# Patient Record
Sex: MALE | Race: WHITE | HISPANIC OR LATINO | Employment: FULL TIME | URBAN - METROPOLITAN AREA
[De-identification: names, ages, dates, MRNs, and addresses within clinical notes are randomized per-mention and may not be internally consistent; named-entity substitution may affect disease eponyms.]

---

## 2020-05-15 ENCOUNTER — NON-PROVIDER VISIT (OUTPATIENT)
Dept: URGENT CARE | Facility: CLINIC | Age: 30
End: 2020-05-15

## 2020-05-15 DIAGNOSIS — Z02.1 PRE-EMPLOYMENT DRUG SCREENING: ICD-10-CM

## 2020-05-15 DIAGNOSIS — Z02.89 ENCOUNTER FOR OCCUPATIONAL HEALTH EXAMINATION: ICD-10-CM

## 2020-05-15 LAB
AMP AMPHETAMINE: NORMAL
COC COCAINE: NORMAL
INT CON NEG: NEGATIVE
INT CON POS: POSITIVE
MET METHAMPHETAMINES: NORMAL
OPI OPIATES: NORMAL
PCP PHENCYCLIDINE: NORMAL
POC DRUG COMMENT 753798-OCCUPATIONAL HEALTH: NORMAL
THC: NORMAL

## 2020-05-15 PROCEDURE — 80305 DRUG TEST PRSMV DIR OPT OBS: CPT | Performed by: PREVENTIVE MEDICINE

## 2020-06-16 ENCOUNTER — OCCUPATIONAL MEDICINE (OUTPATIENT)
Dept: URGENT CARE | Facility: PHYSICIAN GROUP | Age: 30
End: 2020-06-16
Payer: COMMERCIAL

## 2020-06-16 ENCOUNTER — HOSPITAL ENCOUNTER (OUTPATIENT)
Dept: RADIOLOGY | Facility: MEDICAL CENTER | Age: 30
End: 2020-06-16
Attending: NURSE PRACTITIONER
Payer: COMMERCIAL

## 2020-06-16 VITALS
DIASTOLIC BLOOD PRESSURE: 104 MMHG | HEIGHT: 68 IN | HEART RATE: 90 BPM | OXYGEN SATURATION: 98 % | TEMPERATURE: 97.7 F | RESPIRATION RATE: 16 BRPM | WEIGHT: 315 LBS | BODY MASS INDEX: 47.74 KG/M2 | SYSTOLIC BLOOD PRESSURE: 160 MMHG

## 2020-06-16 DIAGNOSIS — S96.911A STRAIN OF RIGHT FOOT, INITIAL ENCOUNTER: ICD-10-CM

## 2020-06-16 DIAGNOSIS — M79.671 ACUTE PAIN OF RIGHT FOOT: ICD-10-CM

## 2020-06-16 PROBLEM — N52.9 IMPOTENCE OF ORGANIC ORIGIN: Status: ACTIVE | Noted: 2020-06-16

## 2020-06-16 PROBLEM — I10 HYPERTENSION: Status: ACTIVE | Noted: 2020-06-16

## 2020-06-16 PROBLEM — E66.01 MORBID OBESITY DUE TO EXCESS CALORIES (HCC): Status: ACTIVE | Noted: 2017-08-09

## 2020-06-16 PROBLEM — B35.4 TINEA CORPORIS: Status: ACTIVE | Noted: 2020-06-16

## 2020-06-16 PROCEDURE — 73630 X-RAY EXAM OF FOOT: CPT | Mod: RT

## 2020-06-16 PROCEDURE — 99203 OFFICE O/P NEW LOW 30 MIN: CPT | Performed by: NURSE PRACTITIONER

## 2020-06-16 NOTE — LETTER
[facility]  [address] [place] [address]  Phone:  [phone] - Fax:  [phone]   [facility] Progress Report and Disability Certification  Date of Service: 6/16/2020   No Show:  No  Date / Time of Next Visit: 6/23/2020   Claim Information   Patient Name: Axel Tidwell Jr.  Claim Number:     Employer: [employer]  Date of Injury: 5/24/2020     Insurer / TPA: [employer]  ID / SSN:     Occupation:   Diagnosis: The encounter diagnosis was Strain of right foot, initial encounter.    Medical Information   Related to Industrial Injury? Yes    Subjective Complaints:  DOI 5/24/20: Patient works at  at [employer]. 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 On DOI he placed his right foot down onto the ground after it came to a stop, felt a twinge to lateral aspect of his right foot. Since then, he has slowly had worsening pain and swelling to the area. Pain is exacerbated with ambulation and pressure. Pain is worse at end of the day and when awakening. He typically wears tennis shoes while at work. He has been soaking his foot twice per day and using a topical cream for pain relief. He has also used ibuprofen. He does not have any other jobs or contributing factors.    Objective Findings: A/Ox4. NAD.  Right foot: Normal in appearance, there is some tenderness to mid fourth and fifth metatarsal.  Foot has full range of motion, neurovascular is intact, skin is intact, cap refill is brisk.  There is no bony tenderness to ankle or toes.  Gait is steady.   Pre-Existing Condition(s): Denies    Assessment:   Initial Visit    Status: Additional Care Required  Permanent Disability:No    Plan: Medication  Comments:OTC Tylenol, supportive footwear, RICE, work restriction, return in 1 week for reevaluation.    Diagnostics: X-ray  Comments:negative       Comments:       Disability Information   Status: Released to Restricted Duty    From:  6/16/2020  Through: 6/23/2020 Restrictions are: Temporary   Physical Restrictions   Sitting:    Standing:  < or = to 6 hrs/day Stooping:    Bending:      Squatting:    Walking:  < or = to 6 hrs/day Climbing:    Pushing:      Pulling:    Other:    Reaching Above Shoulder (L):   Reaching Above Shoulder (R):       Reaching Below Shoulder (L):    Reaching Below Shoulder (R):      Not to exceed Weight Limits   Carrying(hrs):   Weight Limit(lb): < or = to 25 pounds Lifting(hrs):   Weight  Limit(lb): < or = to 25 pounds   Comments:      Repetitive Actions   Hands: i.e. Fine Manipulations from Grasping:     Feet: i.e. Operating Foot Controls:     Driving / Operate Machinery:     Physician Name: SAMANTHA Armenta Physician Signature: TERESA Garber e-Signature: Dr. Dwight Larry, Medical Director   Clinic Name / Location: [address] [address] Clinic Phone Number: Dept: [phone]   Appointment Time: 6:00 Pm Visit Start Time: 6:18 PM   Check-In Time:  6:09 Pm Visit Discharge Time: 7:03 Pm    Original-Treating Physician or Chiropractor    Page 2-Insurer/TPA    Page 3-Employer    Page 4-Employee

## 2020-06-16 NOTE — LETTER
"EMPLOYEE’S CLAIM FOR COMPENSATION/ REPORT OF INITIAL TREATMENT  FORM C-4    EMPLOYEE’S CLAIM - PROVIDE ALL INFORMATION REQUESTED   First Name  Axel Last Name  Yaw Birthdate                    1990                Sex  male Claim Number   Home Address  [address]  Age  30 y.o. Height  1.715 m (5' 7.5\") Weight  (!) 142.9 kg (315 lb) [facility]     [facility] Zip  [address] Telephone  [phone] (home)    Mailing Address  [address]  [facility] [place] [facility] Zip  [address] Primary Language Spoken  English    Insurer  [employer] Third Party   [employer]   Employee's Occupation (Job Title) When Injury or Occupational Disease Occurred      Employer's Name  [employer]  Telephone  [phone]    Employer Address  [address]  [facility]  [address]   Date of Injury  5/24/2020               Hour of Injury  2:15 AM Date Employer Notified  6/16/2020 Last Day of Work after Injury or Occupational Disease  6/16/2020 Supervisor to Whom Injury Reported  Lele   Address or Location of Accident (if applicable)  [[address] [place]]   What were you doing at the time of accident? (if applicable)  Using a morext scooter    How did this injury or occupational disease occur? (Be specific an answer in detail. Use additional sheet if necessary)  Long period of standing. towards the end f da felt sharp pain when stepping off   If you believe that you have an occupational disease, when did you first have knowledge of the disability and it relationship to your employment?  na Witnesses to the Accident  na      Nature of Injury or Occupational Disease  Workers' Compensation  Part(s) of Body Injured or Affected  Foot (L), Foot (R), N/A    I certify that the above is true and correct to the best of my knowledge and that I have provided " this information in order to obtain the benefits of [place]  I hereby authorize any physician, chiropractor, surgeon, practitioner, or other person, any hospital, including [facility] or [facility] hospital, any medical service organization, any insurance company, or other institution or organization to release to each other, any medical or other information, including benefits paid or payable, pertinent to this injury or disease, except information relative to diagnosis, treatment and/or counseling for AIDS, psychological conditions, alcohol or controlled substances, for which I must give specific authorization.  A Photostat of this authorization shall be as valid as the original.     Date   Place   Employee’s Signature   THIS REPORT MUST BE COMPLETED AND MAILED WITHIN 3 WORKING DAYS OF TREATMENT   Place  [facility]  Name of Facility  [facility]   Date  6/16/2020 Diagnosis  (S96.911A) Strain of right foot, initial encounter Is there evidence the injured employee was under the influence of alcohol and/or another controlled substance at the time of accident?   Hour  6:18 PM Description of Injury or Disease  The encounter diagnosis was Strain of right foot, initial encounter. No   Treatment  OTC Tylenol, supportive footwear, RICE, work restriction, return in 1 week for reevaluation  Have you advised the patient to remain off work five days or more? No   X-Ray Findings  Negative   If Yes   From Date  To Date      From information given by the employee, together with medical evidence, can you directly connect this injury or occupational disease as job incurred?  Yes If No Full Duty No Modified Duty  Yes   Is additional medical care by a physician indicated?  Yes If Modified Duty, Specify any Limitations / Restrictions  Per D-39   Do you know of any previous injury or disease contributing to  "this condition or occupational disease?                            No   Date  6/16/2020 Print Doctor’s Name SAMANTHA Armenta I certify the employer’s copy of  this form was mailed on:   Address  [address] Insurer’s Use Only     [facility] Zip  [address]    Provider’s Tax ID Number  [id number] Telephone  Dept: [phone]        myriam-TERESA Monge   e-Signature: Dr. Dwight Larry,   Medical Director Degree  MD        ORIGINAL-TREATING PHYSICIAN OR CHIROPRACTOR    PAGE 2-INSURER/TPA    PAGE 3-EMPLOYER    PAGE 4-EMPLOYEE             Form C-4 (rev.10/07)              BRIEF DESCRIPTION OF RIGHTS AND BENEFITS  (Pursuant to [place]RS 616C.050)    Notice of Injury or Occupational Disease (Incident Report Form C-1): If an injury or occupational disease (OD) arises out of and in the course of employment, you must provide written notice to your employer as soon as practicable, but no later than 7 days after the accident or OD. Your employer shall maintain a sufficient supply of the required forms.    Claim for Compensation (Form C-4): If medical treatment is sought, the form C-4 is available at the place of initial treatment. A completed \"Claim for Compensation\" (Form C-4) must be filed within 90 days after an accident or OD. The treating physician or chiropractor must, within 3 working days after treatment, complete and mail to the employer, the employer's insurer and third-party , the Claim for Compensation.    Medical Treatment: If you require medical treatment for your on-the-job injury or OD, you may be required to select a physician or chiropractor from a list provided by your workers’ compensation insurer, if it has contracted with an Organization for Managed Care (MCO) or Preferred Provider Organization (PPO) or providers of health care. If your employer has not entered into a contract with an MCO or PPO, you may select a physician or chiropractor from the Panel of Physicians " and Chiropractors. Any medical costs related to your industrial injury or OD will be paid by your insurer.    Temporary Total Disability (TTD): If your doctor has certified that you are unable to work for a period of at least 5 consecutive days, or 5 cumulative days in a 20-day period, or places restrictions on you that your employer does not accommodate, you may be entitled to TTD compensation.    Temporary Partial Disability (TPD): If the wage you receive upon reemployment is less than the compensation for TTD to which you are entitled, the insurer may be required to pay you TPD compensation to make up the difference. TPD can only be paid for a maximum of 24 months.    Permanent Partial Disability (PPD): When your medical condition is stable and there is an indication of a PPD as a result of your injury or OD, within 30 days, your insurer must arrange for an evaluation by a rating physician or chiropractor to determine the degree of your PPD. The amount of your PPD award depends on the date of injury, the results of the PPD evaluation and your age and wage.    Permanent Total Disability (PTD): If you are medically certified by a treating physician or chiropractor as permanently and totally disabled and have been granted a PTD status by your insurer, you are entitled to receive monthly benefits not to exceed 66 2/3% of your average monthly wage. The amount of your PTD payments is subject to reduction if you previously received a PPD award.    Vocational Rehabilitation Services: You may be eligible for vocational rehabilitation services if you are unable to return to the job due to a permanent physical impairment or permanent restrictions as a result of your injury or occupational disease.    Transportation and Per Abhinav Reimbursement: You may be eligible for travel expenses and per abhinav associated with medical treatment.    Reopening: You may be able to reopen your claim if your condition worsens after claim  closure.    Appeal Process: If you disagree with a written determination issued by the insurer or the insurer does not respond to your request, you may appeal to the Department of Administration, , by following the instructions contained in your determination letter. You must appeal the determination within 70 days from the date of the determination letter at [address], [address], [place], [place] [address], or [address], [address], [place], [place] [address]. If you disagree with the  decision, you may appeal to the Department of Administration, . You must file your appeal within 30 days from the date of the  decision letter at [address], [address], [place], [place] [address], or [address], [facility] [address], [place], [place] [address]. If you disagree with a decision of an , you may file a petition for judicial review with the District Court. You must do so within 30 days of the Appeal Officer’s decision. You may be represented by an  at your own expense or you may contact the [facility] for possible representation.    [place]  for Injured Workers (NAIW): If you disagree with a  decision, you may request that NAIW represent you without charge at an  Hearing. For information regarding denial of benefits, you may contact the [facility] at: [address], [address], [place] [address], [phone], or [address], [address], [place] [address], [phone]    To File a Complaint with the Division: If you wish to file a complaint with the  of the Division of Industrial Relations (DIR),  please contact the Workers’ Compensation Section, [address], [facility] [address], [place], [place] [address], telephone [phone], or [address], [place], [place] [address], telephone [phone].    For assistance with Workers’  Compensation Issues: You may contact the Office of the Governor Consumer Health Assistance, [address], [place], [place] [address], Toll Free 1-435.135.2626, Web site: http://govcha.[facility].nv., E-mail [email or web address].                   __________________________________________________________________                                                     _________        Employee Name / Signature                                                                                                                                              Date                                                                                                                                                                                                     D-2 (rev. 06/18)

## 2020-06-17 NOTE — PROGRESS NOTES
Chief Complaint   Patient presents with   • Work-Related Injury     NEW WC/Feet pain       HISTORY OF PRESENT ILLNESS: Patient is a 30 y.o. male who presents to urgent care today with a work comp complaint of right foot pain. DOI 5/24/20: Patient works at  at [employer]. 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 On DOI he placed his right foot down onto the ground after it came to a stop, felt a twinge to lateral aspect of his right foot. Since then, he has slowly had worsening pain and swelling to the area. Pain is exacerbated with ambulation and pressure. Pain is worse at end of the day and when awakening. He typically wears tennis shoes while at work. He has been soaking his foot twice per day and using a topical cream for pain relief. He has also used ibuprofen. He does not have any other jobs or contributing factors.  As a side note, the patient admits to history of hypertension, he is prescribed medication for such but did not take today.      PMH: No pertinent past medical history to this problem  MEDS: Medications were reviewed in Epic  ALLERGIES: Allergies were reviewed in Epic  FH: No pertinent family history to this problem      ROS:  Review of Systems   Constitutional: Negative for fever, chills, weight loss, malaise, and fatigue.   HENT: Negative for ear pain, nosebleeds, congestion, sore throat and neck pain.    Eyes: Negative for vision changes.   Neuro: Negative for headache, sensory changes, weakness, seizure, LOC.   Cardiovascular: Negative for chest pain, palpitations, orthopnea and leg swelling.   Respiratory: Negative for cough, sputum production, shortness of breath and wheezing.   Gastrointestinal: Negative for abdominal pain, nausea, vomiting or diarrhea.   Genitourinary: Negative for dysuria, urgency and frequency.  Musculoskeletal: Negative for falls, neck pain, back pain, joint pain, myalgias.   Skin:  "Negative for rash, diaphoresis.     Exam:  /104   Pulse 90   Temp 36.5 °C (97.7 °F) (Temporal)   Resp 16   Ht 1.715 m (5' 7.5\")   Wt (!) 142.9 kg (315 lb)   SpO2 98%   General: well-nourished, well-developed male in NAD  Head: normocephalic, atraumatic  Eyes: PERRLA, no conjunctival injection, acuity grossly intact, lids normal.  Ears: normal shape and symmetry, no tenderness, no discharge. External canals are without any significant edema or erythema. Tympanic membranes are without any inflammation, no effusion. Gross auditory acuity is intact.  Nose: symmetrical without tenderness, no discharge.  Mouth/Throat: reasonable hygiene, no erythema, exudates or tonsillar enlargement.  Neck: no masses, range of motion within normal limits, no tracheal deviation. No obvious thyroid enlargement.   Lymph: no cervical adenopathy. No supraclavicular adenopathy.   Neuro: alert and oriented. Cranial nerves 1-12 grossly intact. No sensory deficit.   Cardiovascular: regular rate and rhythm. No edema.  Pulmonary: no distress. Chest is symmetrical with respiration, no wheezes, crackles, or rhonchi.   Musculoskeletal: no clubbing, appropriate muscle tone, gait is stable.Right foot: Normal in appearance, there is some tenderness to mid fourth and fifth metatarsal.  Foot has full range of motion, neurovascular is intact, skin is intact, cap refill is brisk.  There is no bony tenderness to ankle or toes.  Gait is steady.   Skin: warm, dry, intact, no clubbing, no cyanosis, no rashes.   Psych: appropriate mood, affect, judgement.       DX right foot radiology reading \"No acute osseous abnormality.\"      Assessment/Plan:  1. Strain of right foot, initial encounter  DX-FOOT-COMPLETE 3+ RIGHT       X-ray negative, suspect strain.  The patient reports improvement with supportive footwear but removes once home from work, therefore I have instructed the patient to wear supportive foot wear with any ambulation.  OTC Tylenol, RICE, " work restriction, return in 1 week for reevaluation.  Supportive care, differential diagnoses, and indications for immediate follow-up discussed with patient.   Pathogenesis of diagnosis discussed including typical length and natural progression.   Instructed to return to clinic or nearest emergency department sooner for any change in condition, further concerns, or worsening of symptoms.  Patient states understanding of the plan of care and discharge instructions.  The patient's blood pressure is found to be elevated today, I have instructed patient to take his blood pressure medication as directed, he is in agreement.        Please note that this dictation was created using voice recognition software. I have made every reasonable attempt to correct obvious errors, but I expect that there are errors of grammar and possibly content that I did not discover before finalizing the note.      OLGA Armenta.

## 2020-06-23 ENCOUNTER — OCCUPATIONAL MEDICINE (OUTPATIENT)
Dept: URGENT CARE | Facility: PHYSICIAN GROUP | Age: 30
End: 2020-06-23
Payer: COMMERCIAL

## 2020-06-23 VITALS
TEMPERATURE: 97.5 F | SYSTOLIC BLOOD PRESSURE: 150 MMHG | DIASTOLIC BLOOD PRESSURE: 108 MMHG | BODY MASS INDEX: 47.74 KG/M2 | HEART RATE: 86 BPM | HEIGHT: 68 IN | WEIGHT: 315 LBS | OXYGEN SATURATION: 100 % | RESPIRATION RATE: 16 BRPM
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
## 2020-06-23 DIAGNOSIS — M72.2 PLANTAR FASCIITIS: ICD-10-CM

## 2020-06-23 PROCEDURE — 99213 OFFICE O/P EST LOW 20 MIN: CPT | Performed by: NURSE PRACTITIONER

## 2020-06-23 ASSESSMENT — ENCOUNTER SYMPTOMS
FEVER: 0
TINGLING: 0
NAUSEA: 0
CHILLS: 0
SENSORY CHANGE: 0

## 2020-06-23 ASSESSMENT — LIFESTYLE VARIABLES: SUBSTANCE_ABUSE: 0

## 2020-06-23 NOTE — PATIENT INSTRUCTIONS
Plantar Fasciitis  Plantar fasciitis is a painful foot condition that affects the heel. It occurs when the band of tissue that connects the toes to the heel bone (plantar fascia) becomes irritated. This can happen after exercising too much or doing other repetitive activities (overuse injury). The pain from plantar fasciitis can range from mild irritation to severe pain that makes it difficult for you to walk or move. The pain is usually worse in the morning or after you have been sitting or lying down for a while.  CAUSES  This condition may be caused by:  · Standing for long periods of time.  · Wearing shoes that do not fit.  · Doing high-impact activities, including running, aerobics, and ballet.  · Being overweight.  · Having an abnormal way of walking (gait).  · Having tight calf muscles.  · Having high arches in your feet.  · Starting a new athletic activity.  SYMPTOMS  The main symptom of this condition is heel pain. Other symptoms include:  · Pain that gets worse after activity or exercise.  · Pain that is worse in the morning or after resting.  · Pain that goes away after you walk for a few minutes.  DIAGNOSIS  This condition may be diagnosed based on your signs and symptoms. Your health care provider will also do a physical exam to check for:  · A tender area on the bottom of your foot.  · A high arch in your foot.  · Pain when you move your foot.  · Difficulty moving your foot.  You may also need to have imaging studies to confirm the diagnosis. These can include:  · X-rays.  · Ultrasound.  · MRI.  TREATMENT   Treatment for plantar fasciitis depends on the severity of the condition. Your treatment may include:  · Rest, ice, and over-the-counter pain medicines to manage your pain.  · Exercises to stretch your calves and your plantar fascia.  · A splint that holds your foot in a stretched, upward position while you sleep (night splint).  · Physical therapy to relieve symptoms and prevent problems in the  future.  · Cortisone injections to relieve severe pain.  · Extracorporeal shock wave therapy (ESWT) to stimulate damaged plantar fascia with electrical impulses. It is often used as a last resort before surgery.  · Surgery, if other treatments have not worked after 12 months.  HOME CARE INSTRUCTIONS  · Take medicines only as directed by your health care provider.  · Avoid activities that cause pain.  · Roll the bottom of your foot over a bag of ice or a bottle of cold water. Do this for 20 minutes, 3-4 times a day.  · Perform simple stretches as directed by your health care provider.  · Try wearing athletic shoes with air-sole or gel-sole cushions or soft shoe inserts.  · Wear a night splint while sleeping, if directed by your health care provider.  · Keep all follow-up appointments with your health care provider.  PREVENTION   · Do not perform exercises or activities that cause heel pain.  · Consider finding low-impact activities if you continue to have problems.  · Lose weight if you need to.  The best way to prevent plantar fasciitis is to avoid the activities that aggravate your plantar fascia.  SEEK MEDICAL CARE IF:  · Your symptoms do not go away after treatment with home care measures.  · Your pain gets worse.  · Your pain affects your ability to move or do your daily activities.  This information is not intended to replace advice given to you by your health care provider. Make sure you discuss any questions you have with your health care provider.  Document Released: 09/12/2002 Document Revised: 04/10/2017 Document Reviewed: 10/28/2015  [employer] Interactive Patient Education © 2017 [employer]

## 2020-06-23 NOTE — LETTER
[facility]  [address]  [place] [address]  Phone:  [phone] - Fax:  [phone]   [facility] Progress Report and Disability Certification  Date of Service: 2020   No Show:  No  Date / Time of Next Visit: 7/3/2020   Claim Information   Patient Name: Axel Tidwell Jr.  Claim Number:     Employer: [employer]  Date of Injury: 2020     Insurer / TPA: [employer]  ID / SSN:     Occupation:   Diagnosis: There were no encounter diagnoses.    Medical Information   Related to Industrial Injury? Yes    Subjective Complaints:  DOI 2020. No improvement. Pain is 3/10. Worse in the morning when he wakes up 9/10. Then depending on how active he had been during the day will depend on how much he hurts the following night. Walking really hurts him.  If he wakes up to use the bathroom at night he has severe pain.  Pain is throbbing pain at plantar aspect of foot and heel. He works on cement and wears tennis shoes. He had to call out 2 days from his work due to pain even with the 6 hour restriction.    Objective Findings: VSS. Gait is mildly antalgic. Pain to palpation over fascia of right foot. No swelling. No deformity. Neg achilles tendonopathy.  Increased pain with flexion or extension of foot.     Pre-Existing Condition(s):     Assessment:   Condition Same    Status: Additional Care Required  Permanent Disability:No    Plan: Medication  Comments:OTC NSAID, acetaminophen, CAM boot, work restrictions, gentle exercises for foot    Diagnostics:      Comments:       Disability Information   Status: Released to Restricted Duty    From:  2020  Through: 7/3/2020 Restrictions are: Temporary   Physical Restrictions   Sitting:    Standing:  < or = to 2 hrs/day Stooping:    Bending:      Squatting:    Walking:  < or = to 4 hrs/day Climbin hrs/day Pushing:      Pulling:    Other:    Reaching Above Shoulder (L):    Reaching Above Shoulder (R):       Reaching Below Shoulder (L):    Reaching Below Shoulder (R):      Not to exceed Weight Limits   Carrying(hrs):   Weight Limit(lb):   Lifting(hrs):   Weight  Limit(lb):     Comments: Wear foot brace at all times except to shower.     Repetitive Actions   Hands: i.e. Fine Manipulations from Grasping:     Feet: i.e. Operating Foot Controls:     Driving / Operate Machinery:     Physician Name: Shey Ritter, A.PJessNJess Physician Signature:   e-Signature: Dr. Dwight Larry, Medical Director   Clinic Name / Location: [address] [address] Clinic Phone Number: Dept: [phone]   Appointment Time: 11:30 Am Visit Start Time: 11:47 AM   Check-In Time:  11:34 Am Visit Discharge Time:  1:00 PM   Original-Treating Physician or Chiropractor    Page 2-Insurer/TPA    Page 3-Employer    Page 4-Employee

## 2020-06-23 NOTE — PROGRESS NOTES
"Subjective:      Axel Tidwell Jr. is a 30 y.o. male who presents with Follow-Up ()       Reviewed past medical, surgical and family history. Reviewed prescription and OTC medications with patient in electronic health record today  Allergies: Patient has no known allergies.            HPI DOI 05/24/2020. No improvement. Pain is 3/10. Worse in the morning when he wakes up 9/10. Then depending on how active he had been during the day will depend on how much he hurts the following night. Walking really hurts him.  If he wakes up to use the bathroom at night he has severe pain.  Pain is throbbing pain at plantar aspect of foot and heel. He works on cement and wears tennis shoes. He had to call out 2 days from his work due to pain even with the 6 hour restriction.     Review of Systems   Constitutional: Negative for chills and fever.   Gastrointestinal: Negative for nausea.   Musculoskeletal: Negative for joint pain.   Neurological: Negative for tingling and sensory change.   Psychiatric/Behavioral: Negative for substance abuse.          Objective:     /108   Pulse 86   Temp 36.4 °C (97.5 °F)   Resp 16   Ht 1.715 m (5' 7.5\")   Wt (!) 142.9 kg (315 lb)   SpO2 100%   BMI 48.61 kg/m²      Physical Exam  Vitals signs and nursing note reviewed.   Constitutional:       Appearance: He is well-developed.   Cardiovascular:      Rate and Rhythm: Normal rate and regular rhythm.   Pulmonary:      Effort: Pulmonary effort is normal. No respiratory distress.   Musculoskeletal:        Feet:    Skin:     General: Skin is warm and dry.      Capillary Refill: Capillary refill takes less than 2 seconds.   Neurological:      Mental Status: He is alert and oriented to person, place, and time.      Cranial Nerves: No cranial nerve deficit.      Coordination: Coordination normal.      Deep Tendon Reflexes: Reflexes are normal and symmetric.   Psychiatric:         Mood and Affect: Mood normal.         Speech: Speech " normal.         Behavior: Behavior normal.         Thought Content: Thought content normal.         VSS. Gait is mildly antalgic. Pain to palpation over fascia of right foot. No swelling. No deformity. Neg achilles tendonopathy.  Increased pain with flexion or extension of foot.         Assessment/Plan:       1. Plantar fasciitis     2. Work related injury         Supportive shoes with arch support/ heel cushion   CAM boot - short applied in UC today   Pt information from UpToDate given to pt.   Gentle exercises.   OTC  analgesic of choice. Follow manufactures dosing and safety precautions.     See [place] D31

## 2020-07-06 ENCOUNTER — OCCUPATIONAL MEDICINE (OUTPATIENT)
Dept: URGENT CARE | Facility: CLINIC | Age: 30
End: 2020-07-06
Payer: COMMERCIAL

## 2020-07-06 VITALS
WEIGHT: 315 LBS | BODY MASS INDEX: 47.74 KG/M2 | RESPIRATION RATE: 18 BRPM | OXYGEN SATURATION: 95 % | SYSTOLIC BLOOD PRESSURE: 142 MMHG | TEMPERATURE: 98 F | HEART RATE: 88 BPM | HEIGHT: 68 IN | DIASTOLIC BLOOD PRESSURE: 80 MMHG

## 2020-07-06 DIAGNOSIS — S96.911D STRAIN OF RIGHT FOOT, SUBSEQUENT ENCOUNTER: ICD-10-CM

## 2020-07-06 DIAGNOSIS — M72.2 PLANTAR FASCIITIS: ICD-10-CM

## 2020-07-06 PROCEDURE — 99214 OFFICE O/P EST MOD 30 MIN: CPT | Performed by: NURSE PRACTITIONER

## 2020-07-06 ASSESSMENT — ENCOUNTER SYMPTOMS
FEVER: 0
WEAKNESS: 0
SENSORY CHANGE: 0

## 2020-07-06 NOTE — LETTER
[facility]  [address] [facility] [address]  [place] [address]  Phone:  [phone] - Fax:  [phone]   [facility] Progress Report and Disability Certification  Date of Service: 7/6/2020   No Show:  No  Date / Time of Next Visit: 7/13/2020   Claim Information   Patient Name: Axel Tidwell Jr.  Claim Number:     Employer: [employer]  Date of Injury: 5/24/2020     Insurer / TPA: [employer]  ID / SSN:     Occupation:   Diagnosis: Diagnoses of Strain of right foot, subsequent encounter and Plantar fasciitis were pertinent to this visit.    Medical Information   Related to Industrial Injury? Yes    Subjective Complaints:  DOI: 05/24/2020. Has had improvement in right foot pain. Hurts intermittently, depends on activity level. Feels boot is throwing off posture, and bothering rt ankle. Had wore it continuously for 5 days. Pain 0/10 while sitting. With longer periods of standing, pain gets up to a 5/10. Throbbing pain. Taking aleve BID with relief. Tolerating wearing tennis shoe. Wrapping his foot as directed and stretches. Denies previous foot injury or pain.     Objective Findings:  Pulses: Dorsalis pedis pulses are 2+ on the right side.   Musculoskeletal:      Right foot: Normal range of motion. No tenderness, bony tenderness, swelling or crepitus.  Distal Neurovascular intact.      Skin integrity: Skin integrity normal. No skin breakdown, erythema or warmth.      Comments: Wearing walking boot. Steady gait. No tenderness to palpation of dorsal foot, heel, or arch. No pain with ROM. No reported pain with standing in clinic.         Pre-Existing Condition(s): None Known.   Assessment:   Condition Improved    Status: Additional Care Required  Permanent Disability:No    Plan: Medication    Diagnostics:      Comments:       Disability Information   Status: Released to Restricted Duty    From:  7/6/2020  Through: 7/13/2020  Restrictions are: Temporary   Physical Restrictions   Sitting:    Standing:  < or = to 4 hrs/day Stooping:    Bending:      Squatting:    Walking:  < or = to 6 hrs/day Climbin hrs/day Pushing:      Pulling:    Other:    Reaching Above Shoulder (L):   Reaching Above Shoulder (R):       Reaching Below Shoulder (L):    Reaching Below Shoulder (R):      Not to exceed Weight Limits   Carrying(hrs):   Weight Limit(lb): < or = to 10 pounds Lifting(hrs):   Weight  Limit(lb): < or = to 10 pounds   Comments: -Continue taking Aleve as directed.  -Transition as tolerated to wearing shoes.  -Continue with ROM exercises and stretches.  -Follow up in 7 days, or with Occupational Health.    Repetitive Actions   Hands: i.e. Fine Manipulations from Grasping:     Feet: i.e. Operating Foot Controls:     Driving / Operate Machinery:     Provider Name:   SAMANTHA Niño Physician Signature:  Physician Name:     Clinic Name / Location: [address] [place] [address] Clinic Phone Number: Dept: [phone]   Appointment Time: 3:30 Pm Visit Start Time: 3:17 PM   Check-In Time:  3:17 Pm Visit Discharge Time:  163   Original-Treating Physician or Chiropractor    Page 2-Insurer/TPA    Page 3-Employer    Page 4-Employee

## 2020-07-06 NOTE — PROGRESS NOTES
Subjective:     Axel Tidwell Jr. is a 30 y.o. male who presents for Foot Injury ([facility], Pt states he has had some slight improvment)        DOI: 05/24/2020. Has had improvement in right foot pain. Hurts intermittently, depends on activity level. Feels boot is throwing off posture, and bothering rt ankle. Had wore it continuously for 5 days. Pain 0/10 while sitting. With longer periods of standing, pain gets up to a 5/10. Throbbing pain. Taking aleve BID with relief. Tolerating wearing tennis shoe. Wrapping his foot as directed and stretches.           History reviewed. No pertinent past medical history.    History reviewed. No pertinent surgical history.    Social History     Socioeconomic History   • Marital status:      Spouse name: Not on file   • Number of children: Not on file   • Years of education: Not on file   • Highest education level: Not on file   Occupational History   • Not on file   Social Needs   • Financial resource strain: Not on file   • Food insecurity     Worry: Not on file     Inability: Not on file   • Transportation needs     Medical: Not on file     Non-medical: Not on file   Tobacco Use   • Smoking status: Light Tobacco Smoker     Types: Cigars   • Smokeless tobacco: Never Used   • Tobacco comment: rare   Substance and Sexual Activity   • Alcohol use: Yes     Alcohol/week: 1.8 - 3.6 oz     Types: 3 - 6 Cans of beer per week     Binge frequency: Daily or almost daily   • Drug use: Not Currently     Types: Marijuana   • Sexual activity: Not on file   Lifestyle   • Physical activity     Days per week: Not on file     Minutes per session: Not on file   • Stress: Not on file   Relationships   • Social connections     Talks on phone: Not on file     Gets together: Not on file     Attends Oriental orthodox service: Not on file     Active member of club or organization: Not on file     Attends meetings of clubs or organizations: Not on file     Relationship status: Not on file   •  "Intimate partner violence     Fear of current or ex partner: Not on file     Emotionally abused: Not on file     Physically abused: Not on file     Forced sexual activity: Not on file   Other Topics Concern   • Not on file   Social History Narrative   • Not on file        History reviewed. No pertinent family history.     No Known Allergies    Review of Systems   Constitutional: Negative for fever.   Musculoskeletal: Positive for joint pain.   Neurological: Negative for sensory change and weakness.   All other systems reviewed and are negative.       Objective:   /80   Pulse 88   Temp 36.7 °C (98 °F) (Temporal)   Resp 18   Ht 1.715 m (5' 7.5\")   Wt (!) 142.9 kg (315 lb)   SpO2 95%   BMI 48.61 kg/m²     Physical Exam  Vitals signs reviewed.   Constitutional:       General: He is not in acute distress.     Appearance: He is well-developed.   HENT:      Head: Normocephalic and atraumatic.      Right Ear: External ear normal.      Left Ear: External ear normal.      Nose: Nose normal.   Eyes:      Conjunctiva/sclera: Conjunctivae normal.   Neck:      Musculoskeletal: Normal range of motion.   Cardiovascular:      Rate and Rhythm: Normal rate.      Pulses:           Dorsalis pedis pulses are 2+ on the right side.   Pulmonary:      Effort: Pulmonary effort is normal.   Musculoskeletal: Normal range of motion.         General: No swelling, tenderness or deformity.      Right foot: Normal range of motion. No tenderness, bony tenderness, swelling or crepitus.      Comments:     Feet:      Right foot:      Skin integrity: Skin integrity normal. No skin breakdown, erythema or warmth.      Comments: Wearing walking boot. Steady gait. No tenderness to palpation of dorsal foot, heel, or arch. No pain with ROM. No reported pain with standing in clinic.    Skin:     General: Skin is warm and dry.      Findings: No rash.   Neurological:      General: No focal deficit present.      Mental Status: He is alert and oriented " to person, place, and time.      GCS: GCS eye subscore is 4. GCS verbal subscore is 5. GCS motor subscore is 6.   Psychiatric:         Mood and Affect: Mood normal.         Speech: Speech normal.         Behavior: Behavior normal.         Thought Content: Thought content normal.         Judgment: Judgment normal.         Assessment/Plan:   1. Strain of right foot, subsequent encounter  - REFERRAL TO OCCUPATIONAL MEDICINE    2. Plantar fasciitis  - REFERRAL TO OCCUPATIONAL MEDICINE  -Continue taking Aleve as directed.  -Transition as tolerated to wearing shoes.  -Continue with ROM exercises and stretches.  -Follow up in 7 days, or with Occupational Health.    Differential diagnosis, natural history, supportive care, and indications for immediate follow-up discussed.    Patient states he terminated his job.His employer specifically wanted him to be evaluated by [facility] [employer], and [facility].

## 2020-07-06 NOTE — PATIENT INSTRUCTIONS
Foot Pain  Many things can cause foot pain. Some common causes are:  · An injury.  · A sprain.  · Arthritis.  · Blisters.  · Bunions.  Follow these instructions at home:  Managing pain, stiffness, and swelling  If directed, put ice on the painful area:  · Put ice in a plastic bag.  · Place a towel between your skin and the bag.  · Leave the ice on for 20 minutes, 2-3 times a day.    Activity  · Do not stand or walk for long periods.  · Return to your normal activities as told by your health care provider. Ask your health care provider what activities are safe for you.  · Do stretches to relieve foot pain and stiffness as told by your health care provider.  · Do not lift anything that is heavier than 10 lb (4.5 kg), or the limit that you are told, until your health care provider says that it is safe. Lifting a lot of weight can put added pressure on your feet.  Lifestyle  · Wear comfortable, supportive shoes that fit you well. Do not wear high heels.  · Keep your feet clean and dry.  General instructions  · Take over-the-counter and prescription medicines only as told by your health care provider.  · Rub your foot gently.  · Pay attention to any changes in your symptoms.  · Keep all follow-up visits as told by your health care provider. This is important.  Contact a health care provider if:  · Your pain does not get better after a few days of self-care.  · Your pain gets worse.  · You cannot stand on your foot.  Get help right away if:  · Your foot is numb or tingling.  · Your foot or toes are swollen.  · Your foot or toes turn white or blue.  · You have warmth and redness along your foot.  Summary  · Common causes of foot pain are injury, sprain, arthritis, blisters or bunions.  · Ice, medicines, and comfortable shoes may help foot pain.  · Contact your health care provider if your pain does not get better after a few days of self-care.  This information is not intended to replace advice given to you by your health  care provider. Make sure you discuss any questions you have with your health care provider.  Document Released: 01/13/2017 Document Revised: 10/03/2019 Document Reviewed: 10/03/2019  Elsevier Patient Education © 2020 [employer]    Plantar Fasciitis Rehab  Ask your health care provider which exercises are safe for you. Do exercises exactly as told by your health care provider and adjust them as directed. It is normal to feel mild stretching, pulling, tightness, or discomfort as you do these exercises. Stop right away if you feel sudden pain or your pain gets worse. Do not begin these exercises until told by your health care provider.  Stretching and range-of-motion exercises  These exercises warm up your muscles and joints and improve the movement and flexibility of your foot. These exercises also help to relieve pain.  Plantar fascia stretch    1. Sit with your left / right leg crossed over your opposite knee.  2. Hold your heel with one hand with that thumb near your arch. With your other hand, hold your toes and gently pull them back toward the top of your foot. You should feel a stretch on the bottom of your toes or your foot (plantar fascia) or both.  3. Hold this stretch for__________ seconds.  4. Slowly release your toes and return to the starting position.  Repeat __________ times. Complete this exercise __________ times a day.  Gastrocnemius stretch, standing  This exercise is also called a calf (gastroc) stretch. It stretches the muscles in the back of the upper calf.  1. Stand with your hands against a wall.  2. Extend your left / right leg behind you, and bend your front knee slightly.  3. Keeping your heels on the floor and your back knee straight, shift your weight toward the wall. Do not arch your back. You should feel a gentle stretch in your upper left / right calf.  4. Hold this position for __________ seconds.  Repeat __________ times. Complete this exercise __________ times a day.  Soleus  stretch, standing  This exercise is also called a calf (soleus) stretch. It stretches the muscles in the back of the lower calf.  1. Stand with your hands against a wall.  2. Extend your left / right leg behind you, and bend your front knee slightly.  3. Keeping your heels on the floor, bend your back knee and shift your weight slightly over your back leg. You should feel a gentle stretch deep in your lower calf.  4. Hold this position for __________ seconds.  Repeat __________ times. Complete this exercise __________ times a day.  Gastroc and soleus stretch, standing step  This exercise stretches the muscles in the back of the lower leg. These muscles are in the upper calf (gastrocnemius) and the lower calf (soleus).  1. Stand with the ball of your left / right foot on a step. The ball of your foot is on the walking surface, right under your toes.  2. Keep your other foot firmly on the same step.  3. Hold on to the wall or a railing for balance.  4. Slowly lift your other foot, allowing your body weight to press your left / right heel down over the edge of the step. You should feel a stretch in your left / right calf.  5. Hold this position for __________ seconds.  6. Return both feet to the step.  7. Repeat this exercise with a slight bend in your left / right knee.  Repeat __________ times with your left / right knee straight and __________ times with your left / right knee bent. Complete this exercise __________ times a day.  Balance exercise  This exercise builds your balance and strength control of your arch to help take pressure off your plantar fascia.  Single leg stand  If this exercise is too easy, you can try it with your eyes closed or while standing on a pillow.  1. Without shoes, stand near a railing or in a doorway. You may hold on to the railing or door frame as needed.  2. Stand on your left / right foot. Keep your big toe down on the floor and try to keep your arch lifted. Do not let your foot roll  inward.  3. Hold this position for __________ seconds.  Repeat __________ times. Complete this exercise __________ times a day.  This information is not intended to replace advice given to you by your health care provider. Make sure you discuss any questions you have with your health care provider.  Document Released: 12/18/2006 Document Revised: 04/09/2020 Document Reviewed: 10/16/2019  Elsevier Patient Education © 2020 Elsevier Inc.

## 2020-09-17 ENCOUNTER — OFFICE VISIT (OUTPATIENT)
Dept: MEDICAL GROUP | Age: 30
End: 2020-09-17
Payer: COMMERCIAL

## 2020-09-17 VITALS
BODY MASS INDEX: 46.65 KG/M2 | HEART RATE: 77 BPM | SYSTOLIC BLOOD PRESSURE: 152 MMHG | TEMPERATURE: 97.9 F | OXYGEN SATURATION: 96 % | DIASTOLIC BLOOD PRESSURE: 110 MMHG | WEIGHT: 315 LBS | HEIGHT: 69 IN

## 2020-09-17 DIAGNOSIS — Z00.00 ANNUAL PHYSICAL EXAM: ICD-10-CM

## 2020-09-17 DIAGNOSIS — Z09 HOSPITAL DISCHARGE FOLLOW-UP: ICD-10-CM

## 2020-09-17 DIAGNOSIS — F51.01 PRIMARY INSOMNIA: ICD-10-CM

## 2020-09-17 DIAGNOSIS — R53.83 FATIGUE, UNSPECIFIED TYPE: ICD-10-CM

## 2020-09-17 DIAGNOSIS — I10 ESSENTIAL HYPERTENSION: ICD-10-CM

## 2020-09-17 DIAGNOSIS — Z71.85 IMMUNIZATION COUNSELING: ICD-10-CM

## 2020-09-17 DIAGNOSIS — F41.1 GAD (GENERALIZED ANXIETY DISORDER): ICD-10-CM

## 2020-09-17 DIAGNOSIS — Z98.84 H/O BARIATRIC SURGERY: ICD-10-CM

## 2020-09-17 DIAGNOSIS — I16.1 HYPERTENSIVE EMERGENCY: ICD-10-CM

## 2020-09-17 DIAGNOSIS — Z13.6 SCREENING FOR CARDIOVASCULAR CONDITION: ICD-10-CM

## 2020-09-17 DIAGNOSIS — F32.1 MODERATE MAJOR DEPRESSION (HCC): ICD-10-CM

## 2020-09-17 DIAGNOSIS — R45.4 ANGER: ICD-10-CM

## 2020-09-17 DIAGNOSIS — E55.9 HYPOVITAMINOSIS D: ICD-10-CM

## 2020-09-17 DIAGNOSIS — Z00.00 HEALTH CARE MAINTENANCE: ICD-10-CM

## 2020-09-17 DIAGNOSIS — E66.01 OBESITY, MORBID, BMI 40.0-49.9 (HCC): ICD-10-CM

## 2020-09-17 PROBLEM — B35.4 TINEA CORPORIS: Status: RESOLVED | Noted: 2020-06-16 | Resolved: 2020-09-17

## 2020-09-17 PROCEDURE — 99214 OFFICE O/P EST MOD 30 MIN: CPT | Mod: 25 | Performed by: INTERNAL MEDICINE

## 2020-09-17 PROCEDURE — 99395 PREV VISIT EST AGE 18-39: CPT | Performed by: INTERNAL MEDICINE

## 2020-09-17 RX ORDER — LORAZEPAM 0.5 MG/1
0.5 TABLET ORAL EVERY 4 HOURS PRN
Qty: 30 TAB | Refills: 0 | Status: SHIPPED | OUTPATIENT
Start: 2020-09-17 | End: 2020-11-09

## 2020-09-17 RX ORDER — LISINOPRIL 20 MG/1
40 TABLET ORAL DAILY
Qty: 60 TAB | Refills: 0 | Status: SHIPPED | OUTPATIENT
Start: 2020-09-17 | End: 2020-11-11

## 2020-09-17 SDOH — HEALTH STABILITY: MENTAL HEALTH: HOW OFTEN DO YOU HAVE A DRINK CONTAINING ALCOHOL?: 2-3 TIMES A WEEK

## 2020-09-17 ASSESSMENT — PATIENT HEALTH QUESTIONNAIRE - PHQ9
SUM OF ALL RESPONSES TO PHQ QUESTIONS 1-9: 20
5. POOR APPETITE OR OVEREATING: 3 - NEARLY EVERY DAY
CLINICAL INTERPRETATION OF PHQ2 SCORE: 3

## 2020-09-17 ASSESSMENT — ANXIETY QUESTIONNAIRES
4. TROUBLE RELAXING: NEARLY EVERY DAY
GAD7 TOTAL SCORE: 21
6. BECOMING EASILY ANNOYED OR IRRITABLE: NEARLY EVERY DAY
3. WORRYING TOO MUCH ABOUT DIFFERENT THINGS: NEARLY EVERY DAY
2. NOT BEING ABLE TO STOP OR CONTROL WORRYING: NEARLY EVERY DAY
7. FEELING AFRAID AS IF SOMETHING AWFUL MIGHT HAPPEN: NEARLY EVERY DAY
5. BEING SO RESTLESS THAT IT IS HARD TO SIT STILL: NEARLY EVERY DAY
1. FEELING NERVOUS, ANXIOUS, OR ON EDGE: NEARLY EVERY DAY

## 2020-09-17 NOTE — LETTER
September 17, 2020         Patient: Axel Tidwell Jr.   YOB: 1990   Date of Visit: 9/17/2020           To Whom it May Concern:    Axel Tidwell was seen in my clinic on 9/17/2020. He may return to work on 9/28/2020.    If you have any questions or concerns, please don't hesitate to call.        Sincerely,           Lance Mahoney M.D.  Electronically Signed

## 2020-09-17 NOTE — LETTER
[facility]  Lance Mahoney M.D.  [address] [place] [address]  Fax: [phone]   Authorization for Release/Disclosure of   Protected Health Information   Name: KACI BELL JR. : 1990 SSN: [id number]   Address: [address] [place] [address]  [place] [address] Phone:    [phone] (home)    I authorize the entity listed below to release/disclose the PHI below to:   [facility]/Lance Mahoney M.D. and Lance Mahoney M.D.   Provider or Entity Name: [facility]     Address   City, State, [facility]   Phone: [phone]      Fax:     Reason for request: continuity of care   Information to be released:    [  ] LAST COLONOSCOPY,  including any PATH REPORT and follow-up  [  ] LAST FIT/COLOGUARD RESULT [  ] LAST DEXA  [  ] LAST MAMMOGRAM  [  ] LAST PAP  [  ] LAST LABS [  ] RETINA EXAM REPORT  [  ] IMMUNIZATION RECORDS  [XXX ] Release all info      [  ] Check here and initial the line next to each item to release ALL health information INCLUDING  _____ Care and treatment for drug and / or alcohol abuse  _____ HIV testing, infection status, or AIDS  _____ Genetic Testing    DATES OF SERVICE OR TIME PERIOD TO BE DISCLOSED: _____________  I understand and acknowledge that:  * This Authorization may be revoked at any time by you in writing, except if your health information has already been used or disclosed.  * Your health information that will be used or disclosed as a result of you signing this authorization could be re-disclosed by the recipient. If this occurs, your re-disclosed health information may no longer be protected by State or Federal laws.  * You may refuse to sign this Authorization. Your refusal will not affect your ability to obtain treatment.  * This Authorization becomes effective upon signing and will  on (date) __________.      If no date is indicated, this Authorization will  one (1) year from the signature date.    Name: Kaci De Jesus  Yaw Dooley    Signature:   Date:     9/17/2020       PLEASE FAX REQUESTED RECORDS BACK TO: [phone]
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
## 2020-09-17 NOTE — LETTER
September 17, 2020         Patient: Axel Tidwell Jr.   YOB: 1990   Date of Visit: 9/17/2020           To Whom it May Concern:    Axel Tidwell was seen in my clinic on 9/17/2020. He may return to work on 0/28/2020.    If you have any questions or concerns, please don't hesitate to call.        Sincerely,           Lance Mahoney M.D.  Electronically Signed

## 2020-09-25 ENCOUNTER — APPOINTMENT (OUTPATIENT)
Dept: MEDICAL GROUP | Age: 30
End: 2020-09-25
Payer: COMMERCIAL

## 2020-09-28 ENCOUNTER — HOSPITAL ENCOUNTER (OUTPATIENT)
Dept: LAB | Facility: MEDICAL CENTER | Age: 30
End: 2020-09-28
Attending: INTERNAL MEDICINE
Payer: COMMERCIAL

## 2020-10-01 ENCOUNTER — TELEMEDICINE (OUTPATIENT)
Dept: MEDICAL GROUP | Age: 30
End: 2020-10-01
Payer: COMMERCIAL

## 2020-10-01 VITALS — BODY MASS INDEX: 46.65 KG/M2 | WEIGHT: 315 LBS | TEMPERATURE: 97.9 F | HEIGHT: 69 IN

## 2020-10-01 DIAGNOSIS — R45.4 ANGER REACTION: ICD-10-CM

## 2020-10-01 DIAGNOSIS — I10 ESSENTIAL HYPERTENSION: ICD-10-CM

## 2020-10-01 DIAGNOSIS — F32.1 MODERATE MAJOR DEPRESSION (HCC): ICD-10-CM

## 2020-10-01 DIAGNOSIS — F51.01 PRIMARY INSOMNIA: ICD-10-CM

## 2020-10-01 DIAGNOSIS — F41.1 GAD (GENERALIZED ANXIETY DISORDER): ICD-10-CM

## 2020-10-01 PROCEDURE — 99214 OFFICE O/P EST MOD 30 MIN: CPT | Mod: 95,CR | Performed by: INTERNAL MEDICINE

## 2020-10-01 RX ORDER — LISINOPRIL 40 MG/1
40 TABLET ORAL DAILY
Qty: 90 TAB | Refills: 0 | Status: SHIPPED | OUTPATIENT
Start: 2020-10-01 | End: 2021-02-12 | Stop reason: SDUPTHER

## 2020-10-01 RX ORDER — HYDROCHLOROTHIAZIDE 25 MG/1
25 TABLET ORAL DAILY
Qty: 90 TAB | Refills: 0 | Status: SHIPPED | OUTPATIENT
Start: 2020-10-01 | End: 2020-11-19

## 2020-10-01 RX ORDER — ZOLPIDEM TARTRATE 5 MG/1
5 TABLET ORAL NIGHTLY PRN
Qty: 30 TAB | Refills: 0 | Status: SHIPPED | OUTPATIENT
Start: 2020-10-01 | End: 2020-11-09
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
## 2020-10-24 DIAGNOSIS — R45.4 ANGER REACTION: ICD-10-CM

## 2020-10-24 DIAGNOSIS — F41.1 GAD (GENERALIZED ANXIETY DISORDER): ICD-10-CM

## 2020-10-24 DIAGNOSIS — F32.1 MODERATE MAJOR DEPRESSION (HCC): ICD-10-CM

## 2020-10-24 DIAGNOSIS — F51.01 PRIMARY INSOMNIA: ICD-10-CM

## 2020-11-07 DIAGNOSIS — F51.01 PRIMARY INSOMNIA: ICD-10-CM

## 2020-11-07 DIAGNOSIS — F41.1 GAD (GENERALIZED ANXIETY DISORDER): ICD-10-CM

## 2020-11-07 DIAGNOSIS — F32.1 MODERATE MAJOR DEPRESSION (HCC): ICD-10-CM

## 2020-11-09 RX ORDER — LORAZEPAM 0.5 MG/1
0.5 TABLET ORAL EVERY 4 HOURS PRN
Qty: 30 TAB | Refills: 0 | Status: SHIPPED | OUTPATIENT
Start: 2020-11-09 | End: 2020-12-09

## 2020-11-09 RX ORDER — ZOLPIDEM TARTRATE 5 MG/1
TABLET ORAL
Qty: 30 TAB | Refills: 0 | Status: SHIPPED | OUTPATIENT
Start: 2020-11-09 | End: 2020-12-09

## 2020-11-11 RX ORDER — LISINOPRIL 20 MG/1
TABLET ORAL
Qty: 30 TAB | Refills: 0 | Status: SHIPPED | OUTPATIENT
Start: 2020-11-11 | End: 2020-11-19

## 2020-11-19 ENCOUNTER — OFFICE VISIT (OUTPATIENT)
Dept: URGENT CARE | Facility: PHYSICIAN GROUP | Age: 30
End: 2020-11-19
Payer: COMMERCIAL

## 2020-11-19 VITALS
DIASTOLIC BLOOD PRESSURE: 104 MMHG | HEIGHT: 68 IN | OXYGEN SATURATION: 98 % | WEIGHT: 315 LBS | RESPIRATION RATE: 15 BRPM | HEART RATE: 86 BPM | BODY MASS INDEX: 47.74 KG/M2 | SYSTOLIC BLOOD PRESSURE: 168 MMHG | TEMPERATURE: 97.3 F

## 2020-11-19 DIAGNOSIS — F32.A DEPRESSION, UNSPECIFIED DEPRESSION TYPE: ICD-10-CM

## 2020-11-19 DIAGNOSIS — I10 HYPERTENSION, UNSPECIFIED TYPE: Primary | ICD-10-CM

## 2020-11-19 DIAGNOSIS — B35.1 ONYCHOMYCOSIS: ICD-10-CM

## 2020-11-19 PROCEDURE — 99214 OFFICE O/P EST MOD 30 MIN: CPT | Performed by: PHYSICIAN ASSISTANT

## 2020-11-19 RX ORDER — SERTRALINE HYDROCHLORIDE 25 MG/1
TABLET, FILM COATED ORAL
COMMUNITY
Start: 2020-09-10 | End: 2020-11-19

## 2020-11-19 RX ORDER — CICLOPIROX 80 MG/ML
SOLUTION TOPICAL
Qty: 6.6 ML | Refills: 2
Start: 2020-11-19 | End: 2021-04-01

## 2020-11-19 RX ORDER — ARIPIPRAZOLE 20 MG/1
TABLET ORAL
COMMUNITY
Start: 2020-11-11 | End: 2021-01-29

## 2020-11-19 RX ORDER — LISINOPRIL 20 MG/1
40 TABLET ORAL DAILY
Qty: 60 TAB | Refills: 2
Start: 2020-11-19 | End: 2021-01-29

## 2020-11-19 ASSESSMENT — ENCOUNTER SYMPTOMS
SHORTNESS OF BREATH: 0
CHILLS: 0
ABDOMINAL PAIN: 0
VOMITING: 0
NAUSEA: 0
DIARRHEA: 0
COUGH: 0
FEVER: 0
DEPRESSION: 0
CONSTIPATION: 0

## 2020-11-19 ASSESSMENT — LIFESTYLE VARIABLES: SUBSTANCE_ABUSE: 0

## 2020-11-19 NOTE — PROGRESS NOTES
Subjective:   Axel Tidwell Jr. is a 30 y.o. male who presents for Medication Refill (cant get into pcp, sertraline 50mg , lisinopril 40mg)        The patient presents to urgent care today for a medication refill.  He would like refills for his sertraline and lisinopril.  He has been diagnosed with hypertension and has been taking lisinopril for over 10 years.  He was recently increased to 40 mg of lisinopril daily 3 months ago.  He denies any adverse effects of medication.  He would also like a refill for his sertraline.  He has been taking medication for 3 months without adverse effects.  He is currently being treated for depression.  He denies any suicide ideation.    He has also noticed thick yellow discoloration of right great toenail.  He has tried over-the-counter fungal treatments without improvement.  He had toenails removed 2 years ago due to chronic inflammation from ingrown.    Review of Systems   Constitutional: Negative for chills, fever and malaise/fatigue.   Respiratory: Negative for cough and shortness of breath.    Gastrointestinal: Negative for abdominal pain, constipation, diarrhea, nausea and vomiting.   Skin:        Nail discoloration and thickness   Psychiatric/Behavioral: Negative for depression, substance abuse and suicidal ideas.   All other systems reviewed and are negative.      PMH:  has a past medical history of Anxiety and Hypertension.  MEDS:   Current Outpatient Medications:   •  aripiprazole (ABILIFY) 20 MG tablet, , Disp: , Rfl:   •  ciclopirox (PENLAC) 8 % solution, Apply to adjacent skin and nail daily.  Remove with alcohol every 7 days., Disp: 6.6 mL, Rfl: 2  •  lisinopril (PRINIVIL) 20 MG Tab, Take 2 Tabs by mouth every day., Disp: 60 Tab, Rfl: 2  •  sertraline (ZOLOFT) 50 MG Tab, Take 1 Tab by mouth every day., Disp: 30 Tab, Rfl: 2  •  LORazepam (ATIVAN) 0.5 MG Tab, TAKE 1 TAB BY MOUTH EVERY FOUR HOURS AS NEEDED FOR ANXIETY FOR UP TO 30 DAYS., Disp: 30 Tab, Rfl: 0  •  " zolpidem (AMBIEN) 5 MG Tab, F51.1---TAKE 1 TAB BY MOUTH AT BEDTIME AS NEEDED FOR SLEEP FOR UP TO 30 DAYS., Disp: 30 Tab, Rfl: 0  •  sertraline (ZOLOFT) 50 MG Tab, TAKE 1 TABLET BY MOUTH EVERY DAY, Disp: 15 Tab, Rfl: 0  •  lisinopril (PRINIVIL) 40 MG tablet, Take 1 Tab by mouth every day., Disp: 90 Tab, Rfl: 0  •  Naproxen Sodium (ALEVE PO), Take  by mouth., Disp: , Rfl:   •  Multiple Vitamins-Minerals (BARIATRIC FUSION) Chew Tab, Take  by mouth every day., Disp: , Rfl:   •  montelukast (SINGULAIR) 10 MG Tab, Take 10 mg by mouth., Disp: , Rfl:   •  loratadine (CLARITIN) 10 MG Tab, Take 10 mg by mouth every day., Disp: , Rfl:   •  ibuprofen (MOTRIN) 200 MG Tab, Take 200 mg by mouth every 6 hours as needed., Disp: , Rfl:   ALLERGIES: No Known Allergies  SURGHX: History reviewed. No pertinent surgical history.  SOCHX:  reports that he has quit smoking. His smoking use included cigars. He has never used smokeless tobacco. He reports current alcohol use of about 0.6 oz of alcohol per week. He reports previous drug use. Drug: Marijuana.  Family History   Problem Relation Age of Onset   • Hypertension Mother    • Psychiatric Illness Mother    • Diabetes Mother    • Hypertension Father    • Hypertension Maternal Grandmother    • Heart Disease Maternal Grandmother    • Hypertension Maternal Grandfather    • Heart Disease Maternal Grandfather    • Hypertension Paternal Grandmother    • Hypertension Paternal Grandfather    • Cancer Paternal Grandfather         cancer   • Diabetes Paternal Grandfather    • Hyperlipidemia Paternal Grandfather         Objective:   BP (!) 168/104   Pulse 86   Temp 36.3 °C (97.3 °F)   Resp 15   Ht 1.715 m (5' 7.5\")   Wt (!) 154.2 kg (340 lb)   SpO2 98%   BMI 52.47 kg/m²     Physical Exam  Vitals signs reviewed.   Constitutional:       General: He is not in acute distress.     Appearance: He is well-developed.   HENT:      Head: Normocephalic and atraumatic.      Right Ear: External ear " normal.      Left Ear: External ear normal.      Nose: Nose normal.      Mouth/Throat:      Mouth: Mucous membranes are moist.   Eyes:      Conjunctiva/sclera: Conjunctivae normal.      Pupils: Pupils are equal, round, and reactive to light.   Neck:      Musculoskeletal: Normal range of motion and neck supple.      Trachea: No tracheal deviation.   Cardiovascular:      Rate and Rhythm: Normal rate and regular rhythm.   Pulmonary:      Effort: Pulmonary effort is normal.      Breath sounds: Normal breath sounds.   Musculoskeletal:        Feet:    Skin:     General: Skin is warm and dry.      Capillary Refill: Capillary refill takes less than 2 seconds.   Neurological:      General: No focal deficit present.      Mental Status: He is alert and oriented to person, place, and time.   Psychiatric:         Mood and Affect: Mood normal.         Behavior: Behavior normal.         Thought Content: Thought content normal.         Judgment: Judgment normal.           Assessment/Plan:     1. Hypertension, unspecified type  lisinopril (PRINIVIL) 20 MG Tab    REFERRAL TO FOLLOW-UP WITH PRIMARY CARE   2. Depression, unspecified depression type  sertraline (ZOLOFT) 50 MG Tab   3. Onychomycosis  ciclopirox (PENLAC) 8 % solution     Repeat /96    Continue to monitor blood pressure at home.  He was given refills for lisinopril and sertraline.  Follow-up as scheduled with new primary care provider on January 29.  A prescription for Penlac nail lacquer was provided.    Follow-up with primary care provider within 7-10 days, referral placed.  If symptoms worsen or persist patient can return to clinic for reevaluation.  Red flags and emergency room precautions discussed. All side effects of medication discussed including allergic response, GI upset, tendon injury, etc. Patient confirmed understanding of information.    Please note that this dictation was created using voice recognition software. I have made every reasonable attempt  to correct obvious errors, but I expect that there are errors of grammar and possibly content that I did not discover before finalizing the note.

## 2021-01-29 ENCOUNTER — TELEMEDICINE (OUTPATIENT)
Dept: MEDICAL GROUP | Facility: PHYSICIAN GROUP | Age: 31
End: 2021-01-29
Payer: COMMERCIAL

## 2021-01-29 VITALS — WEIGHT: 315 LBS | HEIGHT: 67 IN | RESPIRATION RATE: 18 BRPM | BODY MASS INDEX: 49.44 KG/M2

## 2021-01-29 DIAGNOSIS — Z13.29 SCREENING FOR ENDOCRINE, METABOLIC AND IMMUNITY DISORDER: ICD-10-CM

## 2021-01-29 DIAGNOSIS — Z98.84 H/O BARIATRIC SURGERY: ICD-10-CM

## 2021-01-29 DIAGNOSIS — F32.1 MODERATE MAJOR DEPRESSION (HCC): ICD-10-CM

## 2021-01-29 DIAGNOSIS — I10 ESSENTIAL HYPERTENSION: ICD-10-CM

## 2021-01-29 DIAGNOSIS — Z13.228 SCREENING FOR ENDOCRINE, METABOLIC AND IMMUNITY DISORDER: ICD-10-CM

## 2021-01-29 DIAGNOSIS — Z13.0 SCREENING FOR ENDOCRINE, METABOLIC AND IMMUNITY DISORDER: ICD-10-CM

## 2021-01-29 DIAGNOSIS — N52.9 IMPOTENCE OF ORGANIC ORIGIN: ICD-10-CM

## 2021-01-29 DIAGNOSIS — E66.01 MORBID OBESITY DUE TO EXCESS CALORIES (HCC): ICD-10-CM

## 2021-01-29 DIAGNOSIS — F51.01 PRIMARY INSOMNIA: ICD-10-CM

## 2021-01-29 DIAGNOSIS — F20.0 PARANOID SCHIZOPHRENIA (HCC): ICD-10-CM

## 2021-01-29 PROBLEM — R45.4 ANGER: Status: RESOLVED | Noted: 2020-09-17 | Resolved: 2021-01-29

## 2021-01-29 PROBLEM — Z00.00 HEALTH CARE MAINTENANCE: Status: RESOLVED | Noted: 2020-09-17 | Resolved: 2021-01-29

## 2021-01-29 PROBLEM — Z71.85 IMMUNIZATION COUNSELING: Status: RESOLVED | Noted: 2020-09-17 | Resolved: 2021-01-29

## 2021-01-29 PROCEDURE — 99215 OFFICE O/P EST HI 40 MIN: CPT | Mod: 95 | Performed by: PHYSICIAN ASSISTANT

## 2021-01-29 RX ORDER — SERTRALINE HYDROCHLORIDE 100 MG/1
TABLET, FILM COATED ORAL
COMMUNITY
Start: 2021-01-15 | End: 2021-05-03 | Stop reason: SDUPTHER

## 2021-01-29 RX ORDER — ARIPIPRAZOLE 400 MG
KIT INTRAMUSCULAR
COMMUNITY
Start: 2021-01-11

## 2021-01-29 RX ORDER — ZOLPIDEM TARTRATE 10 MG/1
TABLET ORAL
COMMUNITY
Start: 2021-01-15

## 2021-01-29 RX ORDER — SILDENAFIL 50 MG/1
50 TABLET, FILM COATED ORAL PRN
Qty: 10 TAB | Refills: 3 | Status: SHIPPED | OUTPATIENT
Start: 2021-01-29 | End: 2021-01-29 | Stop reason: SDUPTHER

## 2021-01-29 RX ORDER — AMLODIPINE BESYLATE 2.5 MG/1
2.5 TABLET ORAL DAILY
Qty: 30 TAB | Refills: 2 | Status: SHIPPED | OUTPATIENT
Start: 2021-01-29 | End: 2021-03-02 | Stop reason: DRUGHIGH

## 2021-01-29 RX ORDER — SILDENAFIL 50 MG/1
TABLET, FILM COATED ORAL
Qty: 10 TAB | Refills: 0 | Status: SHIPPED | OUTPATIENT
Start: 2021-01-29

## 2021-01-29 NOTE — ASSESSMENT & PLAN NOTE
Has improved. Using Zolpidem every night which is helpful. No side effects from medication. Managed by Psych.

## 2021-01-29 NOTE — ASSESSMENT & PLAN NOTE
"Been experiencing erectile dysfunction for the last year. Patient is able to get an erection but not able to maintain one and can't get it back. Took an OTC pill from the [facility] (\"Rhino pill\") which did help him maintain an erection.  He denies chest pain, shortness of breath, history of CAD.    "

## 2021-01-29 NOTE — ASSESSMENT & PLAN NOTE
Patient states that he has not been on a meal prepping diet for many months now.  He states that it is more expensive to be on the diet and also he is not working out so he does not want to eat a healthy diet.  Also his family has not been eating a healthy diet and he has been eating with they are.  They recently joined weight watchers and was started tracking her calories.  Patient states that he is interested in doing that as well

## 2021-01-29 NOTE — ASSESSMENT & PLAN NOTE
Stopped doing his meal prep diet.    Was not working out and was just eating whatever his family is eating. Patient states cost as a reason for not eating healthier foods. Family started doing weight watchers. He is interested in doing weight watchers.
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
## 2021-01-29 NOTE — ASSESSMENT & PLAN NOTE
Patient recently on a hold at [place] [facility] of this year. Was hearing voices in his head and was feeling very suicidal. Voices in his head were telling him to kill himself. Diagnosed with paranoid schizophrenia. Started on Abilify IM which has helped with the voices a lot. Started with psychiatrist Dr. Santos and seeing him once a month.

## 2021-01-29 NOTE — ASSESSMENT & PLAN NOTE
Patient has been on lisinopril for 10 years, recently increased dose to 40 mg after going to urgent care last month. Patient takes his blood pressure occasionally at home but recently left his cuff in [place] so has no longer been able to take his blood pressure.  He did take his blood pressure at [facility] last week and was 145/90.
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
## 2021-01-29 NOTE — ASSESSMENT & PLAN NOTE
This is a new problem that was diagnosed 1/2021 after patient was placed on a hold at [facility] for suicidal ideation.  Patient was started on Abilify and reports that the voices in his head that were telling him to kill himself have improved.  He is no longer suicidal and is following up with a psychiatrist monthly.

## 2021-01-29 NOTE — PROGRESS NOTES
Patient messaged me stating how expensive the medication was going to be.  I sent in a prescription for sildenafil 100 mg so he can cut the tablets in half and just take 50 mg but will last twice as long to cut down on patient cost.  Also encouraged him to get the good Rx jessica to lower the price.

## 2021-02-12 DIAGNOSIS — I10 ESSENTIAL HYPERTENSION: ICD-10-CM

## 2021-02-12 RX ORDER — LISINOPRIL 40 MG/1
40 TABLET ORAL DAILY
Qty: 90 TABLET | Refills: 3 | Status: SHIPPED | OUTPATIENT
Start: 2021-02-12

## 2021-03-02 ENCOUNTER — TELEMEDICINE (OUTPATIENT)
Dept: MEDICAL GROUP | Facility: PHYSICIAN GROUP | Age: 31
End: 2021-03-02
Payer: COMMERCIAL

## 2021-03-02 VITALS
HEART RATE: 80 BPM | BODY MASS INDEX: 49.44 KG/M2 | DIASTOLIC BLOOD PRESSURE: 98 MMHG | WEIGHT: 315 LBS | SYSTOLIC BLOOD PRESSURE: 144 MMHG | RESPIRATION RATE: 14 BRPM | HEIGHT: 67 IN

## 2021-03-02 DIAGNOSIS — Z13.29 SCREENING FOR ENDOCRINE, METABOLIC AND IMMUNITY DISORDER: ICD-10-CM

## 2021-03-02 DIAGNOSIS — N52.9 IMPOTENCE OF ORGANIC ORIGIN: ICD-10-CM

## 2021-03-02 DIAGNOSIS — Z13.0 SCREENING FOR ENDOCRINE, METABOLIC AND IMMUNITY DISORDER: ICD-10-CM

## 2021-03-02 DIAGNOSIS — I10 ESSENTIAL HYPERTENSION: Primary | ICD-10-CM

## 2021-03-02 DIAGNOSIS — Z13.228 SCREENING FOR ENDOCRINE, METABOLIC AND IMMUNITY DISORDER: ICD-10-CM

## 2021-03-02 DIAGNOSIS — R35.0 URINARY FREQUENCY: ICD-10-CM

## 2021-03-02 DIAGNOSIS — F20.0 PARANOID SCHIZOPHRENIA (HCC): ICD-10-CM

## 2021-03-02 DIAGNOSIS — Z80.0 FAMILY HISTORY OF PANCREATIC CANCER: ICD-10-CM

## 2021-03-02 PROBLEM — R53.83 FATIGUE: Status: ACTIVE | Noted: 2021-03-02

## 2021-03-02 PROCEDURE — 99214 OFFICE O/P EST MOD 30 MIN: CPT | Mod: 95,CR | Performed by: PHYSICIAN ASSISTANT

## 2021-03-02 RX ORDER — AMLODIPINE BESYLATE 5 MG/1
5 TABLET ORAL DAILY
Qty: 30 TABLET | Refills: 3 | Status: SHIPPED | OUTPATIENT
Start: 2021-03-02 | End: 2021-04-01 | Stop reason: DRUGHIGH

## 2021-03-02 NOTE — ASSESSMENT & PLAN NOTE
Patient reports a history of pancreatic cancer in two of his immediate family members and is inquiring about tumor marker screening.

## 2021-03-02 NOTE — ASSESSMENT & PLAN NOTE
"Patient reports urinary frequency for the last month. Has always had \"a small bladder\" but the last few months is peeing more frequently of 2-3 times per hour. He is trying to stay hydrated but not drinking more than usual. He states that he does not have dysuria. Occasionally has urinary urgency. Also feeling more fatigued than usual and has polyuria.   "

## 2021-03-02 NOTE — PROGRESS NOTES
"Virtual Visit: Established Patient   This visit was conducted via Zoom using secure and encrypted videoconferencing technology. The patient was in a private location in the state of [place].    The patient's identity was confirmed and verbal consent was obtained for this virtual visit.    Subjective:   CC:   Chief Complaint   Patient presents with   • Follow-Up     Frequent urination and fatigue     Axel Tidwell Jr. is a 30 y.o. male presenting for evaluation and management of:    Hypertension  Patient was seen a month ago and amlodipine 2.5 mg was added to his anti hypertensive medications.  He reports that he has been taking his blood pressure at home and it has been running systolic in the 130s/80s. BP was 144/98 before taking his medication. He denies side effects of the medication.    Urinary frequency  Patient reports urinary frequency for the last month. Has always had \"a small bladder\" but the last few months is peeing more frequently of 2-3 times per hour. He is trying to stay hydrated but not drinking more than usual. He states that he does not have dysuria. Occasionally has urinary urgency. Also feeling more fatigued than usual and has polyuria.     Fatigue  Patient reports fatigue for a couple of months starting in January. Reports that he goes to be at 10pm and will sleep until 5:30pm the next day. Was started on Abilify in October 2020. Feels like his depression/anxiety is not well controlled.     Paranoid schizophrenia (HCC)  Discussed with psychiatrist that he was having voices in his head and blacking out. Was told he waited too long to have abilify injections so was given abilify tablets in case he needs it.      BMI 50.0-59.9, adult (HCC)  Patient reports his family recently had a death in the family and has not started weight watchers. He has been trying to watch his portions. Does not have a scale but feels like weight is the same.     Impotence of organic origin  Worsened, cannot get " an erection now when before he could not maintain an erection. Viagra helps.  He states he is not even able to masturbate     Family history of pancreatic cancer  Patient reports a history of pancreatic cancer in two of his immediate family members and is inquiring about tumor marker screening.     ROS   Denies any recent fevers or chills. No nausea or vomiting. No chest pains or shortness of breath.     No Known Allergies    Current medicines (including changes today)  Current Outpatient Medications   Medication Sig Dispense Refill   • amLODIPine (NORVASC) 5 MG Tab Take 1 tablet by mouth every day. 30 tablet 3   • lisinopril (PRINIVIL) 40 MG tablet Take 1 tablet by mouth every day. 90 tablet 3   • zolpidem (AMBIEN) 10 MG Tab TAKE 1 TABLET BY MOUTH AT BEDTIME AS NEEDED  G47.00     • ABILIFY MAINTENA 400 MG IM injection      • sertraline (ZOLOFT) 100 MG Tab TAKE 1 TABLET BY MOUTH EVERY MORNING FOR MOOD/ANXIETY     • sildenafil citrate (VIAGRA) 50 MG tablet Take 1-2 tablets by mouth as needed for erectile dysfunction. 10 Tab 0   • ciclopirox (PENLAC) 8 % solution Apply to adjacent skin and nail daily.  Remove with alcohol every 7 days. 6.6 mL 2   • Multiple Vitamins-Minerals (BARIATRIC FUSION) Chew Tab Take  by mouth every day.     • montelukast (SINGULAIR) 10 MG Tab Take 10 mg by mouth.     • loratadine (CLARITIN) 10 MG Tab Take 10 mg by mouth every day.       No current facility-administered medications for this visit.       Patient Active Problem List    Diagnosis Date Noted   • Urinary frequency 03/02/2021   • Fatigue 03/02/2021   • Family history of pancreatic cancer 03/02/2021   • Paranoid schizophrenia (HCC) 01/29/2021   • Moderate major depression (HCC) 09/17/2020   • Primary insomnia 09/17/2020   • H/O bariatric surgery 09/17/2020   • Hypertension 06/16/2020   • Impotence of organic origin 06/16/2020   • BMI 50.0-59.9, adult (HCC) 08/09/2017       Family History   Problem Relation Age of Onset   •  "Hypertension Mother    • Psychiatric Illness Mother    • Diabetes Mother    • Hypertension Father    • Hypertension Maternal Grandmother    • Heart Disease Maternal Grandmother    • Hypertension Maternal Grandfather    • Heart Disease Maternal Grandfather    • Hypertension Paternal Grandmother    • Hypertension Paternal Grandfather    • Cancer Paternal Grandfather         cancer   • Diabetes Paternal Grandfather    • Hyperlipidemia Paternal Grandfather        He  has a past medical history of Anxiety and Hypertension.  He  has a past surgical history that includes gastric resection (2016).       Objective:   /98   Pulse 80   Resp 14   Ht 1.702 m (5' 7\")   Wt (!) 154 kg (340 lb)   BMI 53.25 kg/m²     Physical Exam:  Constitutional: Alert, no distress, well-groomed.  Skin: No rashes in visible areas.  Eye: Round. Conjunctiva clear, lids normal. No icterus.   ENMT: Lips pink without lesions, good dentition, moist mucous membranes. Phonation normal.  Neck: No masses, no thyromegaly. Moves freely without pain.  Respiratory: Unlabored respiratory effort, no cough or audible wheeze  Psych: Alert and oriented x3, normal affect and mood.       Assessment and Plan:   The following treatment plan was discussed:     1. Essential hypertension  Chronic.  Patient reports that he has been compliant with the Norvasc and his blood pressure has been running 130s over 80s.  His goal blood pressure should be below 130/80 so we are going to increase his amlodipine to 5 mg and have him continue to take his blood pressure at home.  He is going to follow-up in 1 month.  He is not having any side effects of the medication.   - amLODIPine (NORVASC) 5 MG Tab; Take 1 tablet by mouth every day.  Dispense: 30 tablet; Refill: 3    2. Urinary frequency  This is a new problem.  Patient reports polyuria and polydipsia.  He already has an order for blood work that he has not had done yet to rule out diabetes.  I am adding on a urinalysis to " "rule out other causes of urinary frequency.  - URINALYSIS,CULTURE IF INDICATED; Future    3. Paranoid schizophrenia (HCC)  Chronic.  Managed by psychiatry.  Patient reports that recently he waited too long to get his Abilify injection and started hearing voices in his head again as well as was \"blacking out.\"  His psychiatrist gave him Abilify tablets to take in case he has that situation again where he is too late to get his injection.     4. BMI 50.0-59.9, adult (HCC)  Chronic.  We extensively discussed the importance of weight loss.  It is likely that if he were to lose weight his blood pressure, erectile dysfunction, and potentially his fatigue would all improve.  Have sent in a referral to our health improvement program.  He has a history of bariatric surgery.   - REFERRAL TO [facility]PROVEMENT PROGRAMS (HIP)    5. Impotence of organic origin  Chronic, worsened.  Patient reports that he is no longer able to get an erection without Viagra.  We discussed that it is important for him to have his blood pressure controlled as well as to lose weight and this problem would likely improve.  Plan is for him to continue with Viagra as needed    6. Screening for endocrine, metabolic and immunity disorder  Patient already has blood work ordered, however given that he is feeling more fatigued than usual I added on a TSH to rule out thyroid disease  - TSH WITH REFLEX TO FT4; Future    7.  Family history of pancreatic cancer  CA 19-9 ordered today for routine screening given that he has a family history of pancreatic cancer in 2 of his immediate family members.  We will plan on yearly screening with this.  He does not have any symptoms of abdominal pain, jaundice, weight loss.    Follow-up: Return in about 4 weeks (around 3/30/2021) for Follow-up blood pressure and lab work.         "

## 2021-03-02 NOTE — ASSESSMENT & PLAN NOTE
Patient was seen a month ago and amlodipine 2.5 mg was added to his anti hypertensive medications.  He reports that he has been taking his blood pressure at home and it has been running systolic in the 130s/80s. BP was 144/98 before taking his medication. He denies side effects of the medication.

## 2021-03-02 NOTE — ASSESSMENT & PLAN NOTE
Worsened, cannot get an erection now when before he could not maintain an erection. Viagra helps.  He states he is not even able to masturbate

## 2021-03-02 NOTE — ASSESSMENT & PLAN NOTE
Patient reports his family recently had a death in the family and has not started weight watchers. He has been trying to watch his portions. Does not have a scale but feels like weight is the same.

## 2021-03-02 NOTE — ASSESSMENT & PLAN NOTE
Patient reports fatigue for a couple of months starting in January. Reports that he goes to be at 10pm and will sleep until 5:30pm the next day. Was started on Abilify in October 2020. Feels like his depression/anxiety is not well controlled.

## 2021-03-02 NOTE — ASSESSMENT & PLAN NOTE
Discussed with psychiatrist that he was having voices in his head and blacking out. Was told he waited too long to have abilify injections so was given abilify tablets in case he needs it.

## 2021-03-26 LAB — HBA1C MFR BLD: 5.6 % (ref 0–5.6)

## 2021-03-30 PROBLEM — E55.9 VITAMIN D DEFICIENCY: Status: ACTIVE | Noted: 2021-03-30

## 2021-03-30 PROBLEM — E78.5 HYPERLIPIDEMIA: Status: ACTIVE | Noted: 2021-03-30

## 2021-04-01 PROBLEM — B35.1 TOENAIL FUNGUS: Status: ACTIVE | Noted: 2021-04-01

## 2021-07-20 DIAGNOSIS — I10 ESSENTIAL HYPERTENSION: ICD-10-CM

## 2021-07-20 RX ORDER — AMLODIPINE BESYLATE 10 MG/1
10 TABLET ORAL DAILY
Qty: 90 TABLET | Refills: 1 | Status: SHIPPED | OUTPATIENT
Start: 2021-07-20